# Patient Record
Sex: FEMALE | Race: WHITE | NOT HISPANIC OR LATINO | URBAN - METROPOLITAN AREA
[De-identification: names, ages, dates, MRNs, and addresses within clinical notes are randomized per-mention and may not be internally consistent; named-entity substitution may affect disease eponyms.]

---

## 2020-10-27 ENCOUNTER — OFFICE VISIT (OUTPATIENT)
Dept: URGENT CARE | Facility: CLINIC | Age: 50
End: 2020-10-27
Payer: COMMERCIAL

## 2020-10-27 VITALS
HEIGHT: 67 IN | SYSTOLIC BLOOD PRESSURE: 142 MMHG | HEART RATE: 79 BPM | RESPIRATION RATE: 16 BRPM | DIASTOLIC BLOOD PRESSURE: 70 MMHG | WEIGHT: 203 LBS | BODY MASS INDEX: 31.86 KG/M2 | TEMPERATURE: 97.5 F | OXYGEN SATURATION: 100 %
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
## 2020-10-27 DIAGNOSIS — T14.8XXA BRUISING: ICD-10-CM

## 2020-10-27 PROCEDURE — 99283 EMERGENCY DEPT VISIT LOW MDM: CPT | Performed by: NURSE PRACTITIONER

## 2020-10-27 PROCEDURE — G0382 LEV 3 HOSP TYPE B ED VISIT: HCPCS | Performed by: NURSE PRACTITIONER

## 2020-10-27 RX ORDER — LEVOTHYROXINE SODIUM 137 UG/1
TABLET ORAL
COMMUNITY
Start: 2020-10-12

## 2020-10-27 RX ORDER — BUPROPION HYDROCHLORIDE 300 MG/1
TABLET ORAL
COMMUNITY
Start: 2020-09-13

## 2020-10-27 RX ORDER — LEVOTHYROXINE SODIUM 0.15 MG/1
TABLET ORAL
COMMUNITY
Start: 2020-10-12

## 2020-10-27 RX ORDER — MESALAMINE 1.2 G/1
TABLET, DELAYED RELEASE ORAL 2 TIMES DAILY
COMMUNITY

## 2020-10-27 RX ORDER — ALPRAZOLAM 0.25 MG/1
0.25 TABLET ORAL 2 TIMES DAILY PRN
COMMUNITY
Start: 2020-09-28 | End: 2021-03-27

## 2020-10-27 RX ORDER — TOPIRAMATE 100 MG/1
50 TABLET, FILM COATED ORAL DAILY
COMMUNITY
Start: 2020-09-17

## 2021-05-21 ENCOUNTER — OFFICE VISIT (OUTPATIENT)
Dept: OBGYN CLINIC | Facility: CLINIC | Age: 51
End: 2021-05-21
Payer: COMMERCIAL

## 2021-05-21 VITALS
HEIGHT: 66 IN | SYSTOLIC BLOOD PRESSURE: 128 MMHG | DIASTOLIC BLOOD PRESSURE: 74 MMHG | WEIGHT: 215 LBS | BODY MASS INDEX: 34.55 KG/M2

## 2021-05-21 DIAGNOSIS — Z12.31 ENCOUNTER FOR SCREENING MAMMOGRAM FOR MALIGNANT NEOPLASM OF BREAST: ICD-10-CM

## 2021-05-21 DIAGNOSIS — Z11.51 SCREENING FOR HPV (HUMAN PAPILLOMAVIRUS): ICD-10-CM

## 2021-05-21 DIAGNOSIS — Z01.419 WELL WOMAN EXAM WITH ROUTINE GYNECOLOGICAL EXAM: Primary | ICD-10-CM

## 2021-05-21 DIAGNOSIS — Z12.4 SCREENING FOR MALIGNANT NEOPLASM OF CERVIX: ICD-10-CM

## 2021-05-21 PROCEDURE — 99386 PREV VISIT NEW AGE 40-64: CPT | Performed by: OBSTETRICS & GYNECOLOGY

## 2021-05-21 PROCEDURE — 0503F POSTPARTUM CARE VISIT: CPT | Performed by: OBSTETRICS & GYNECOLOGY

## 2021-05-21 PROCEDURE — G0145 SCR C/V CYTO,THINLAYER,RESCR: HCPCS | Performed by: OBSTETRICS & GYNECOLOGY

## 2021-05-21 PROCEDURE — 87624 HPV HI-RISK TYP POOLED RSLT: CPT | Performed by: OBSTETRICS & GYNECOLOGY

## 2021-05-21 RX ORDER — ALPRAZOLAM 0.25 MG/1
0.25 TABLET ORAL DAILY
COMMUNITY
Start: 2021-04-25 | End: 2021-10-22

## 2021-05-21 NOTE — PROGRESS NOTES
ASSESSMENT & PLAN: Swati Sanchez is a 46 y o  [id number] with normal gynecologic exam     1   Routine well woman exam done today  2   Pap and HPV:Pap with HPV was done today  Current ASCCP Guidelines reviewed  3   Mammogram ordered  Recommend yearly mammography  4   Colonoscopy recommended per guidelines  History of UC, followed regularly  5  The patient is sexually active  Had Paragard IUD placed in 2016  Discussed removal after cessation of menses x 12 months  6  Covid vaccine: done   7  The following were reviewed in today's visit: breast self exam, use and side effects of OCPs, exercise and healthy diet  8  Patient to return to office in 12 months for annual exam      All questions have been answered to her satisfaction  CC:  Annual Gynecologic Examination    HPI: Swati Sanchez is a 46 y o  Aidee Brisk  who presents for annual gynecologic examination  She has the following concerns:  Noticing weight gain  Feels like some of it is thyroid related and some menopause related  Occasionally has spotting after sex - no pain, discharge  Had started having irregular periods but then got more regular when her daughters moved home d/t covid  She does report hot flashes, night sweats, other symptoms of menopause - does also attribute some of this to her thyroid dysfunction as this has been ongoing for many years  She occasionally reports some stress urinary incontinence  Health Maintenance:    She exercises 0 days per week recently renewed her gym membership  She wears her seatbelt routinely  She does perform regular monthly self breast exams  She feels safe at home  She does follow a balanced diet  She does not use tobacco - does vape as it improves her UC symptoms  Last mammogram: 12/2020  Last colonoscopy: 2017    Past Medical History:   Diagnosis Date    Anxiety     Disease of thyroid gland     Graves disease     Ulcerative colitis ([facility] [address] )        History reviewed   No pertinent surgical history  Past OB/Gyn History:   Patient's last menstrual period was 05/21/2021  Having regular menses    Patient is currently sexually active: heterosexual      Last Pap  2020 : no abnormals per patient    OB History   No obstetric history on file  Family History:  History reviewed  No pertinent family history  Social History:  Social History     Socioeconomic History    Marital status: Unknown     Spouse name: Not on file    Number of children: Not on file    Years of education: Not on file    Highest education level: Not on file   Occupational History    Not on file   Social Needs    Financial resource strain: Not on file    Food insecurity     Worry: Not on file     Inability: Not on file    Transportation needs     Medical: Not on file     Non-medical: Not on file   Tobacco Use    Smoking status: Not on file   Substance and Sexual Activity    Alcohol use: Not on file    Drug use: Not on file    Sexual activity: Not on file   Lifestyle    Physical activity     Days per week: Not on file     Minutes per session: Not on file    Stress: Not on file   Relationships    Social connections     Talks on phone: Not on file     Gets together: Not on file     Attends [employer] service: Not on file     Active member of club or organization: Not on file     Attends meetings of clubs or organizations: Not on file     Relationship status: Not on file    Intimate partner violence     Fear of current or ex partner: Not on file     Emotionally abused: Not on file     Physically abused: Not on file     Forced sexual activity: Not on file   Other Topics Concern    Not on file   Social History Narrative    Not on file     Presently lives with her  and daughters  Patient is   Patient is currently employed  Allergies:   Allergies   Allergen Reactions    Latex Hives and Rash    Sulfa Antibiotics     Acetazolamide Rash       Medications:    Current Outpatient Medications:    ALPRAZolam (XANAX) 0 25 mg tablet, Take 0 25 mg by mouth daily, Disp: , Rfl:     buPROPion (WELLBUTRIN XL) 300 mg 24 hr tablet, TAKE 1 TABLET BY MOUTH EVERY MORNING, Disp: , Rfl:     levothyroxine 137 mcg tablet, Take 1 tab every other day, alternating with levothyroxine 150 mcg every other day, Disp: , Rfl:     levothyroxine 150 mcg tablet, Use 1 tablet every other day with levothyroxine 137 mcg daily, Disp: , Rfl:     mesalamine (LIALDA) 1 2 g EC tablet, 2 (two) times a day , Disp: , Rfl:     topiramate (TOPAMAX) 100 mg tablet, Take 50 mg by mouth daily , Disp: , Rfl:     ALPRAZolam (XANAX) 0 25 mg tablet, Take 0 25 mg by mouth 2 (two) times a day as needed, Disp: , Rfl:     Review of Systems:  Denies fevers, chills, unintentional weight loss, excessive fatigue, chest pain, shortness of breath, abdominal pain, nausea, vomiting, urinary incontinence, urinary frequency, vaginal bleeding, vaginal discharge  All other systems negative unless otherwise stated  Physical Exam:  /74 (BP Location: Left arm, Patient Position: Sitting, Cuff Size: Large)   Ht 5' 6" (1 676 m)   Wt 97 5 kg (215 lb)   LMP 05/21/2021   BMI 34 70 kg/m²  Body mass index is 34 7 kg/m²  GEN: The patient was alert and oriented x3, pleasant well-appearing female in no acute distress  HEENT:  Unremarkable, s/p thyroidectomy  CV:  Regular rate and rhythm, normal S1 and S2, no murmurs  RESP:  Clear to auscultation bilaterally, no wheezes, rales or rhonchi  BREAST:  Symmetric breasts with no palpable breast masses or obvious breast lesions  She has no retractions or nipple discharge  She has no axillary abnormalities or palpable masses  GI:  Soft, nontender, non-distended  MSK: bilateral lower extremities are nontender, no edema  : Normal appearing external female genitalia, normal appearing urethral meatus  On sterile speculum exam, minimal bleeding present   Normal appearing vaginal epithelium, no vaginal discharge, grossly normal appearing cervix  IUD threads visible  On bimanual exam, no cervical motion tenderness; uterus is smooth, mobile, nontender 6 weeks size  No tenderness or fullness in the bilateral adnexa

## 2021-05-24 LAB
HPV HR 12 DNA CVX QL NAA+PROBE: NEGATIVE
HPV16 DNA CVX QL NAA+PROBE: NEGATIVE
HPV18 DNA CVX QL NAA+PROBE: NEGATIVE

## 2021-05-28 LAB
LAB AP GYN PRIMARY INTERPRETATION: NORMAL
Lab: NORMAL

## 2021-10-15 ENCOUNTER — OFFICE VISIT (OUTPATIENT)
Dept: URGENT CARE | Facility: CLINIC | Age: 51
End: 2021-10-15
Payer: COMMERCIAL

## 2021-10-15 VITALS
BODY MASS INDEX: 35.36 KG/M2 | HEART RATE: 83 BPM | RESPIRATION RATE: 16 BRPM | DIASTOLIC BLOOD PRESSURE: 70 MMHG | TEMPERATURE: 97.5 F | HEIGHT: 66 IN | SYSTOLIC BLOOD PRESSURE: 143 MMHG | WEIGHT: 220 LBS | OXYGEN SATURATION: 100 %

## 2021-10-15 DIAGNOSIS — N30.01 ACUTE CYSTITIS WITH HEMATURIA: Primary | ICD-10-CM

## 2021-10-15 LAB
SL AMB  POCT GLUCOSE, UA: ABNORMAL
SL AMB LEUKOCYTE ESTERASE,UA: ABNORMAL
SL AMB POCT BILIRUBIN,UA: ABNORMAL
SL AMB POCT BLOOD,UA: ABNORMAL
SL AMB POCT CLARITY,UA: ABNORMAL
SL AMB POCT COLOR,UA: YELLOW
SL AMB POCT KETONES,UA: 40
SL AMB POCT NITRITE,UA: ABNORMAL
SL AMB POCT PH,UA: 6.5
SL AMB POCT SPECIFIC GRAVITY,UA: 1.02
SL AMB POCT URINE PROTEIN: ABNORMAL
SL AMB POCT UROBILINOGEN: 0.2

## 2021-10-15 PROCEDURE — 99213 OFFICE O/P EST LOW 20 MIN: CPT | Performed by: NURSE PRACTITIONER

## 2021-10-15 PROCEDURE — 81002 URINALYSIS NONAUTO W/O SCOPE: CPT | Performed by: NURSE PRACTITIONER

## 2021-10-15 PROCEDURE — 87077 CULTURE AEROBIC IDENTIFY: CPT | Performed by: NURSE PRACTITIONER

## 2021-10-15 PROCEDURE — 87186 SC STD MICRODIL/AGAR DIL: CPT | Performed by: NURSE PRACTITIONER

## 2021-10-15 PROCEDURE — 87086 URINE CULTURE/COLONY COUNT: CPT | Performed by: NURSE PRACTITIONER

## 2021-10-15 RX ORDER — NITROFURANTOIN 25; 75 MG/1; MG/1
100 CAPSULE ORAL 2 TIMES DAILY
Qty: 14 CAPSULE | Refills: 0 | Status: SHIPPED | OUTPATIENT
Start: 2021-10-15

## 2021-10-15 RX ORDER — PHENAZOPYRIDINE HYDROCHLORIDE 100 MG/1
100 TABLET, FILM COATED ORAL 3 TIMES DAILY PRN
Qty: 10 TABLET | Refills: 0 | Status: SHIPPED | OUTPATIENT
Start: 2021-10-15

## 2021-10-15 RX ORDER — TRIAMTERENE AND HYDROCHLOROTHIAZIDE 37.5; 25 MG/1; MG/1
1 CAPSULE ORAL DAILY
COMMUNITY
Start: 2021-09-21 | End: 2022-09-21

## 2021-10-15 RX ORDER — OMEPRAZOLE 20 MG/1
CAPSULE, DELAYED RELEASE ORAL
COMMUNITY
Start: 2021-08-11

## 2021-10-18 LAB
BACTERIA UR CULT: ABNORMAL
BACTERIA UR CULT: ABNORMAL

## 2025-06-11 ENCOUNTER — APPOINTMENT (OUTPATIENT)
Dept: LAB | Facility: MEDICAL CENTER | Age: 55
End: 2025-06-11
Attending: PHYSICIAN ASSISTANT

## 2025-06-11 ENCOUNTER — OCCMED (OUTPATIENT)
Dept: URGENT CARE | Facility: MEDICAL CENTER | Age: 55
End: 2025-06-11

## 2025-06-11 DIAGNOSIS — Z02.1 PHYSICAL EXAM, PRE-EMPLOYMENT: Primary | ICD-10-CM

## 2025-06-11 LAB — RUBV IGG SERPL IA-ACNC: 45.9 IU/ML

## 2025-06-11 PROCEDURE — 86735 MUMPS ANTIBODY: CPT | Performed by: PHYSICIAN ASSISTANT

## 2025-06-11 PROCEDURE — 36415 COLL VENOUS BLD VENIPUNCTURE: CPT

## 2025-06-11 PROCEDURE — 86480 TB TEST CELL IMMUN MEASURE: CPT | Performed by: PHYSICIAN ASSISTANT

## 2025-06-11 PROCEDURE — 86762 RUBELLA ANTIBODY: CPT | Performed by: PHYSICIAN ASSISTANT

## 2025-06-11 PROCEDURE — 86765 RUBEOLA ANTIBODY: CPT | Performed by: PHYSICIAN ASSISTANT

## 2025-06-11 PROCEDURE — 86787 VARICELLA-ZOSTER ANTIBODY: CPT | Performed by: PHYSICIAN ASSISTANT

## 2025-06-12 LAB
GAMMA INTERFERON BACKGROUND BLD IA-ACNC: 0.03 IU/ML
M TB IFN-G BLD-IMP: NEGATIVE
M TB IFN-G CD4+ BCKGRND COR BLD-ACNC: 0.04 IU/ML
M TB IFN-G CD4+ BCKGRND COR BLD-ACNC: 0.04 IU/ML
MEV IGG SER QL IA: 20.6 AU/ML
MEV IGG SER QL IA: POSITIVE
MITOGEN IGNF BCKGRD COR BLD-ACNC: 9.97 IU/ML
MUV IGG SER QL IA: 26.1 AU/ML
MUV IGG SER QL IA: POSITIVE
VZV IGG SER QL IA: 8.91 S/CO
VZV IGG SER QL IA: POSITIVE

## 2025-08-12 ENCOUNTER — OFFICE VISIT (OUTPATIENT)
Dept: FAMILY MEDICINE CLINIC | Facility: CLINIC | Age: 55
End: 2025-08-12
Payer: COMMERCIAL

## 2025-08-12 PROBLEM — K51.919 ULCERATIVE COLITIS WITH COMPLICATION (HCC): Status: ACTIVE | Noted: 2025-08-12

## 2025-08-12 PROBLEM — R00.2 PALPITATIONS: Status: ACTIVE | Noted: 2025-08-12

## 2025-08-12 PROBLEM — R07.2 PRECORDIAL PAIN: Status: ACTIVE | Noted: 2025-08-12

## 2025-08-12 PROBLEM — E05.00 GRAVES DISEASE: Status: ACTIVE | Noted: 2025-08-12

## 2025-08-12 PROBLEM — K63.9 ARTHRITIS ASSOCIATED WITH INFLAMMATORY BOWEL DISEASE: Status: ACTIVE | Noted: 2025-08-12

## 2025-08-12 PROBLEM — G89.29 CHRONIC BILATERAL LOW BACK PAIN: Status: ACTIVE | Noted: 2025-08-12

## 2025-08-12 PROBLEM — M54.50 CHRONIC BILATERAL LOW BACK PAIN: Status: ACTIVE | Noted: 2025-08-12

## 2025-08-12 PROBLEM — L80 VITILIGO: Status: ACTIVE | Noted: 2025-08-12

## 2025-08-12 PROBLEM — M06.9 RHEUMATOID ARTHRITIS (HCC): Status: ACTIVE | Noted: 2025-08-12

## 2025-08-12 PROBLEM — M07.60 ARTHRITIS ASSOCIATED WITH INFLAMMATORY BOWEL DISEASE: Status: ACTIVE | Noted: 2025-08-12
